# Patient Record
Sex: MALE | Employment: UNEMPLOYED | URBAN - METROPOLITAN AREA
[De-identification: names, ages, dates, MRNs, and addresses within clinical notes are randomized per-mention and may not be internally consistent; named-entity substitution may affect disease eponyms.]

---

## 2017-03-06 DIAGNOSIS — I49.9 IRREGULAR HEART BEAT: Primary | ICD-10-CM

## 2017-03-07 ENCOUNTER — HOSPITAL ENCOUNTER (OUTPATIENT)
Dept: CARDIOLOGY | Facility: CLINIC | Age: 62
Discharge: HOME | End: 2017-03-07
Payer: COMMERCIAL

## 2017-03-07 ENCOUNTER — OFFICE VISIT (OUTPATIENT)
Dept: CARDIOLOGY | Facility: CLINIC | Age: 62
End: 2017-03-07
Payer: COMMERCIAL

## 2017-03-07 VITALS
SYSTOLIC BLOOD PRESSURE: 192 MMHG | WEIGHT: 165.13 LBS | HEIGHT: 64 IN | HEART RATE: 50 BPM | BODY MASS INDEX: 28.19 KG/M2 | DIASTOLIC BLOOD PRESSURE: 110 MMHG

## 2017-03-07 DIAGNOSIS — Z95.1 S/P CABG (CORONARY ARTERY BYPASS GRAFT): ICD-10-CM

## 2017-03-07 DIAGNOSIS — I49.3 PVC'S (PREMATURE VENTRICULAR CONTRACTIONS): ICD-10-CM

## 2017-03-07 DIAGNOSIS — I10 SEVERE HYPERTENSION: ICD-10-CM

## 2017-03-07 DIAGNOSIS — I49.9 IRREGULAR HEART BEAT: ICD-10-CM

## 2017-03-07 LAB
AORTIC VALVE REGURGITATION: ABNORMAL
DIASTOLIC DYSFUNCTION: YES
ESTIMATED PA SYSTOLIC PRESSURE: 33.03
MITRAL VALVE MOBILITY: NORMAL
MITRAL VALVE REGURGITATION: ABNORMAL
RETIRED EF AND QEF - SEE NOTES: 40 (ref 55–65)
TRICUSPID VALVE REGURGITATION: ABNORMAL

## 2017-03-07 PROCEDURE — 93306 TTE W/DOPPLER COMPLETE: CPT | Mod: S$GLB,,, | Performed by: INTERNAL MEDICINE

## 2017-03-07 PROCEDURE — 99203 OFFICE O/P NEW LOW 30 MIN: CPT | Mod: S$GLB,,, | Performed by: INTERNAL MEDICINE

## 2017-03-07 PROCEDURE — 99999 PR PBB SHADOW E&M-EST. PATIENT-LVL III: CPT | Mod: PBBFAC,,, | Performed by: INTERNAL MEDICINE

## 2017-03-07 PROCEDURE — 93000 ELECTROCARDIOGRAM COMPLETE: CPT | Mod: S$GLB,,, | Performed by: INTERNAL MEDICINE

## 2017-03-07 PROCEDURE — 1160F RVW MEDS BY RX/DR IN RCRD: CPT | Mod: S$GLB,,, | Performed by: INTERNAL MEDICINE

## 2017-03-07 PROCEDURE — 3080F DIAST BP >= 90 MM HG: CPT | Mod: S$GLB,,, | Performed by: INTERNAL MEDICINE

## 2017-03-07 PROCEDURE — 3077F SYST BP >= 140 MM HG: CPT | Mod: S$GLB,,, | Performed by: INTERNAL MEDICINE

## 2017-03-07 RX ORDER — CARVEDILOL 25 MG/1
12.5 TABLET ORAL 2 TIMES DAILY WITH MEALS
Qty: 60 TABLET | Refills: 11 | Status: SHIPPED | OUTPATIENT
Start: 2017-03-07 | End: 2017-03-07 | Stop reason: SDUPTHER

## 2017-03-07 RX ORDER — ASPIRIN 81 MG/1
81 TABLET ORAL DAILY
COMMUNITY
End: 2017-03-09

## 2017-03-07 RX ORDER — CHLORTHALIDONE 25 MG/1
25 TABLET ORAL DAILY
Qty: 30 TABLET | Refills: 11 | Status: SHIPPED | OUTPATIENT
Start: 2017-03-07 | End: 2017-04-29 | Stop reason: SDUPTHER

## 2017-03-07 RX ORDER — CARVEDILOL 25 MG/1
12.5 TABLET ORAL 2 TIMES DAILY WITH MEALS
Qty: 60 TABLET | Refills: 11 | Status: SHIPPED | OUTPATIENT
Start: 2017-03-07 | End: 2017-03-09 | Stop reason: SDUPTHER

## 2017-03-07 RX ORDER — CHLORTHALIDONE 25 MG/1
25 TABLET ORAL DAILY
Qty: 30 TABLET | Refills: 11 | Status: SHIPPED | OUTPATIENT
Start: 2017-03-07 | End: 2017-03-07 | Stop reason: SDUPTHER

## 2017-03-07 NOTE — MR AVS SNAPSHOT
[facility]  [address]  [facility] [address]  Phone: [phone]                  Poppy Jeffries   3/7/2017 2:00 PM   Office Visit    Description:  Male : 1955   Provider:  Jose Rafael Olivas MD   Department:  [facility]           Reason for Visit     Irregular Heart Beat     Hypertension           Diagnoses this Visit        Comments    S/P CABG (coronary artery bypass graft)         Severe hypertension         PVC's (premature ventricular contractions)                To Do List           Future Appointments        Provider Department Dept Phone    3/7/2017 3:15 PM [facility] [phone]    3/9/2017 8:00 AM Jose Rafael Olivas MD [facility] Card[facility] [phone]      Goals (5 Years of Data)     None      Follow-Up and Disposition     Return in about 2 days (around 3/9/2017).       These Medications        Disp Refills Start End    chlorthalidone (HYGROTEN) 25 MG Tab 30 tablet 11 3/7/2017     Take 1 tablet (25 mg total) by mouth once daily. - Oral    carvedilol (COREG) 25 MG tablet 60 tablet 11 3/7/2017     Take 0.5 tablets (12.5 mg total) by mouth 2 (two) times daily with meals. - Oral      [facility]     [facility] -  Assistance  Registered nurses in the [facility] provide clinical advisement, health education, appointment booking, and other advisory services.  Call for this free service at [phone].             Medications           Message regarding Medications     Verify the changes and/or additions to your medication regime listed below are the same as discussed with your clinician today.  If any of these changes or additions are incorrect, please notify your healthcare provider.        START taking these NEW medications        Refills    chlorthalidone (HYGROTEN) 25 MG Tab 11    Sig: Take 1 tablet (25 mg total) by mouth once daily.    Class: Print    Route: Oral    carvedilol  "(COREG) 25 MG tablet 11    Sig: Take 0.5 tablets (12.5 mg total) by mouth 2 (two) times daily with meals.    Class: Print    Route: Oral           Verify that the below list of medications is an accurate representation of the medications you are currently taking.  If none reported, the list may be blank. If incorrect, please contact your healthcare provider. Carry this list with you in case of emergency.           Current Medications     aspirin (ECOTRIN) 81 MG EC tablet Take 81 mg by mouth once daily.    carvedilol (COREG) 25 MG tablet Take 0.5 tablets (12.5 mg total) by mouth 2 (two) times daily with meals.    chlorthalidone (HYGROTEN) 25 MG Tab Take 1 tablet (25 mg total) by mouth once daily.           Clinical Reference Information           Your Vitals Were     BP Pulse Height Weight BMI    192/110 (BP Location: Left arm, Patient Position: Sitting, BP Method: Manual) 50 5' 4" (1.626 m) 74.9 kg (165 lb 2 oz) 28.34 kg/m2      Blood Pressure          Most Recent Value    Right Arm BP - Sitting  208/112    Left Arm BP - Sitting  192/110    BP  (!)  192/110      Allergies as of 3/7/2017     No Known Allergies      Immunizations Administered on Date of Encounter - 3/7/2017     None      Orders Placed During Today's Visit     Future Labs/Procedures Expected by Expires    Comprehensive metabolic panel  3/7/2017 5/6/2018    Lipid panel  3/7/2017 (Approximate) 3/7/2018    2D echo with color flow doppler and bubble contrast  As directed 3/7/2018      [facility] Sign-Up     Activating your [facility] account is as easy as 1-2-3!     1) Visit [email or web address], select Sign Up Now, enter this activation code and your date of birth, then select Next.  [email or web address]  Expires: 4/21/2017  1:04 PM      2) Create a username and password to use when you visit [facility] in the future and select a security question in case you lose your password and select Next.    3) Enter your e-mail address and click Sign Up!    Additional " Information  If you have questions, please e-mail [email or web address] or call [phone] to talk to our [facility] staff. Remember, [facility] is NOT to be used for urgent needs. For medical emergencies, dial 911.         Language Assistance Services     ATTENTION: Language assistance services are available, free of charge. Please call [phone].      ATENCIÓN: Si habla español, tiene a sanchez disposición servicios gratuitos de asistencia lingüística. Llame al [phone].     [facility] Ý: N?u b?n nói Ti?ng Vi?t, có các d?ch v? h? tr? ngôn ng? mi?n phí dành cho b?n. G?i s? [phone].         [facility] complies with applicable Federal civil rights laws and does not discriminate on the basis of race, color, national origin, age, disability, or sex.

## 2017-03-07 NOTE — PROGRESS NOTES
Chart has been dictated using voice recognition software.  It is not been reviewed carefully for any transcriptional errors due to this technology.   Subjective:   Patient ID:  Poppy Jeffries is a 61 y.o. male who presents for evaluation of Irregular Heart Beat and Hypertension      HPI: Patient applied for job at [employer] and was found to have hypertension and arrhythmia. Patient denies any chest discomfort on exertion or at rest.   Patient denies any dyspnea at rest or on exertion, orthopnea, PND, or edema.  Patient denies any palpitations, lightheadedness, or syncope. Patient denies lower extremity claudication.  Has had hypertension since 2000 on lisinopril until 2010 when he lost his job.  Has not had medication for 7 years.    Patient s/p CABG in 2004 in [place]    Cardiac risk factors: hyperlipidemia, hypertension    History reviewed. No pertinent past medical history.    Past Surgical History:   Procedure Laterality Date    BYPASS GRAFT  2004    CORONARY ARTERY BYPASS GRAFT  2004       Social History   Substance Use Topics    Smoking status: Never Smoker    Smokeless tobacco: None    Alcohol use No         Current Outpatient Prescriptions:     aspirin (ECOTRIN) 81 MG EC tablet, Take 81 mg by mouth once daily., Disp: , Rfl:     carvedilol (COREG) 25 MG tablet, Take 0.5 tablets (12.5 mg total) by mouth 2 (two) times daily with meals., Disp: 60 tablet, Rfl: 11    chlorthalidone (HYGROTEN) 25 MG Tab, Take 1 tablet (25 mg total) by mouth once daily., Disp: 30 tablet, Rfl: 11    Review of patient's allergies indicates:  No Known Allergies    ROS - - The patient's review of systems is negative for any significant constitutional, respiratory, endocrine, hematologic, musculoskeletal or neurologic symptoms.   Objective:   Physical Exam   Constitutional: He is oriented to person, place, and time. He appears well-developed and well-nourished.   BP (!) 192/110 (BP Location: Left arm, Patient Position:  "Sitting, BP Method: Manual)  Pulse (!) 50  Ht 5' 4" (1.626 m)  Wt 74.9 kg (165 lb 2 oz)  BMI 28.34 kg/m2   Eyes: Pupils are equal, round, and reactive to light.   Neck: Neck supple. No JVD present. Carotid bruit is not present. No thyromegaly present.   Cardiovascular: Normal rate, regular rhythm and intact distal pulses.  Frequent extrasystoles are present. PMI is not displaced.  Exam reveals gallop and S4. Exam reveals no friction rub.    No murmur heard.  Pulmonary/Chest: Effort normal and breath sounds normal. He has no wheezes. He has no rales.       Abdominal: Soft. Bowel sounds are normal. There is no hepatosplenomegaly. There is no tenderness.   Musculoskeletal: He exhibits no edema.   Neurological: He is alert and oriented to person, place, and time. He has normal strength.   Skin: No cyanosis. Nails show no clubbing.       No results found for: WBC, HGB, HCT, MCV, PLT    No results found for: NA, K, BUN, CREATININE, GLU, HGBA1C, CHOL, HDL, LDLCALC, TRIG, CHOLHDL, HGB, HCT, PLT, INR     ECG shows sinus tachycardia with significant voltage increased compatible with LVH and frequent PVCs occasionally in couplets.  There are also Q waves in the inferior leads which could be compatible with an inferior infarct of undetermined age.    Pulmonary results of his echocardiogram obtained today shows reduced left ventricular function about 35-40% with some inferolateral hypokinesis over and above the global hypokinesis.  There is mild LV enlargement, mild to moderate aortic insufficiency, mild mitral regurgitation.  Official report is pending    Assessment:     1. S/P CABG (coronary artery bypass graft)    2. Severe hypertension    3. PVC's (premature ventricular contractions)      The patient has a significant cardiac problems due to not taking antihypertensive medication for at least 7 years.  He is totally asymptomatic at this time despite showing a significant cardiomyopathy with mild aortic insufficiency, " severe hypertension, and significant ventricular ectopy on his ECG.  As the patient is totally asymptomatic, and attempt will be made to treat him initially as an outpatient.  The patient will be put on chlorthalidone and a moderate dose of carvedilol starting today and will be seen back on Thursday morning.  Ultimately, the patient will need to be on goal-directed therapy for his cardiomyopathy.  The patient will also need to undergo stress testing to determine if there is an active ischemic complement to his cardiac disease.  Further decisions in the patient's care will be determined by his blood pressure when he returns in 2 days.  Plan:     Poppy was seen today for irregular heart beat and hypertension.    Diagnoses and all orders for this visit:    S/P CABG (coronary artery bypass graft)  -     2D echo with color flow doppler and bubble contrast; Future  -     Discontinue: chlorthalidone (HYGROTEN) 25 MG Tab; Take 1 tablet (25 mg total) by mouth once daily.  -     Discontinue: carvedilol (COREG) 25 MG tablet; Take 0.5 tablets (12.5 mg total) by mouth 2 (two) times daily with meals.  -     Comprehensive metabolic panel; Future; Expected date: 3/7/17  -     Lipid panel; Future; Expected date: 3/7/17  -     carvedilol (COREG) 25 MG tablet; Take 0.5 tablets (12.5 mg total) by mouth 2 (two) times daily with meals.  -     chlorthalidone (HYGROTEN) 25 MG Tab; Take 1 tablet (25 mg total) by mouth once daily.    Severe hypertension  -     2D echo with color flow doppler and bubble contrast; Future  -     Discontinue: chlorthalidone (HYGROTEN) 25 MG Tab; Take 1 tablet (25 mg total) by mouth once daily.  -     Discontinue: carvedilol (COREG) 25 MG tablet; Take 0.5 tablets (12.5 mg total) by mouth 2 (two) times daily with meals.  -     Comprehensive metabolic panel; Future; Expected date: 3/7/17  -     Lipid panel; Future; Expected date: 3/7/17  -     carvedilol (COREG) 25 MG tablet; Take 0.5 tablets (12.5 mg total) by  mouth 2 (two) times daily with meals.  -     chlorthalidone (HYGROTEN) 25 MG Tab; Take 1 tablet (25 mg total) by mouth once daily.    PVC's (premature ventricular contractions)    Other orders  -     aspirin (ECOTRIN) 81 MG EC tablet; Take 81 mg by mouth once daily.

## 2017-03-09 ENCOUNTER — OFFICE VISIT (OUTPATIENT)
Dept: CARDIOLOGY | Facility: CLINIC | Age: 62
End: 2017-03-09
Payer: COMMERCIAL

## 2017-03-09 VITALS
DIASTOLIC BLOOD PRESSURE: 92 MMHG | SYSTOLIC BLOOD PRESSURE: 139 MMHG | HEIGHT: 69 IN | HEART RATE: 79 BPM | WEIGHT: 164.25 LBS | BODY MASS INDEX: 24.33 KG/M2

## 2017-03-09 DIAGNOSIS — I10 SEVERE HYPERTENSION: ICD-10-CM

## 2017-03-09 DIAGNOSIS — E78.5 HYPERLIPIDEMIA, UNSPECIFIED: ICD-10-CM

## 2017-03-09 DIAGNOSIS — I10 ESSENTIAL HYPERTENSION: ICD-10-CM

## 2017-03-09 DIAGNOSIS — I51.89 COMBINED SYSTOLIC AND DIASTOLIC CARDIAC DYSFUNCTION: ICD-10-CM

## 2017-03-09 DIAGNOSIS — I49.3 PVC'S (PREMATURE VENTRICULAR CONTRACTIONS): ICD-10-CM

## 2017-03-09 DIAGNOSIS — Z95.1 S/P CABG (CORONARY ARTERY BYPASS GRAFT): Primary | ICD-10-CM

## 2017-03-09 PROCEDURE — 3080F DIAST BP >= 90 MM HG: CPT | Mod: S$GLB,,, | Performed by: INTERNAL MEDICINE

## 2017-03-09 PROCEDURE — 99213 OFFICE O/P EST LOW 20 MIN: CPT | Mod: S$GLB,,, | Performed by: INTERNAL MEDICINE

## 2017-03-09 PROCEDURE — 99999 PR PBB SHADOW E&M-EST. PATIENT-LVL III: CPT | Mod: PBBFAC,,, | Performed by: INTERNAL MEDICINE

## 2017-03-09 PROCEDURE — 1160F RVW MEDS BY RX/DR IN RCRD: CPT | Mod: S$GLB,,, | Performed by: INTERNAL MEDICINE

## 2017-03-09 PROCEDURE — 3075F SYST BP GE 130 - 139MM HG: CPT | Mod: S$GLB,,, | Performed by: INTERNAL MEDICINE

## 2017-03-09 RX ORDER — CARVEDILOL 25 MG/1
25 TABLET ORAL 2 TIMES DAILY WITH MEALS
Qty: 60 TABLET | Refills: 11 | Status: SHIPPED | OUTPATIENT
Start: 2017-03-09 | End: 2017-04-04 | Stop reason: SDUPTHER

## 2017-03-09 RX ORDER — ATORVASTATIN CALCIUM 40 MG/1
40 TABLET, FILM COATED ORAL DAILY
Qty: 30 TABLET | Refills: 11 | Status: SHIPPED | OUTPATIENT
Start: 2017-03-09 | End: 2017-03-16 | Stop reason: SDUPTHER

## 2017-03-09 RX ORDER — VALSARTAN 160 MG/1
160 TABLET ORAL DAILY
Qty: 30 TABLET | Refills: 11 | Status: SHIPPED | OUTPATIENT
Start: 2017-03-09 | End: 2017-04-27 | Stop reason: SDUPTHER

## 2017-03-09 NOTE — PROGRESS NOTES
"Chart has been dictated using voice recognition software.  It is not been reviewed carefully for any transcriptional errors due to this technology.   Subjective:   Patient ID:  Poppy Jeffries is a 61 y.o. male who presents for follow-up of S/P CABG (coronary artery bypass graft)      HPI: Patient with h/o uncontrolled hypertension now with systolic dysfunction on echocardiogram below.  Patient started on carveidilol and chlorthalidone 2 days ago.  Pateitn feeling better with "eyes not hot" and "muscles more relaxed. Remains asymptomatic for ischemia, arrhythmia, and heart failure.    Echo 07-Mar-2017    1 - Mild aortic regurgitation.     2 - Upper limit of normal left ventricular enlargement.     3 - Eccentric hypertrophy.     4 - Mildly to moderately depressed left ventricular systolic function (EF 40-45%).     5 - Quantitatively measured LV function is 43%.     6 - Left ventricular diastolic dysfunction.     7 - Mild mitral regurgitation.     8 - Moderate left atrial enlargement.     9 - The estimated PA systolic pressure is 33 mmHg.     10 - Right ventricular enlargement with low normal to mildly depressed systolic function.     11 - Trivial tricuspid regurgitation.       Current Outpatient Prescriptions:     carvedilol (COREG) 25 MG tablet, Take 1 tablet (25 mg total) by mouth 2 (two) times daily with meals., Disp: 60 tablet, Rfl: 11    chlorthalidone (HYGROTEN) 25 MG Tab, Take 1 tablet (25 mg total) by mouth once daily., Disp: 30 tablet, Rfl: 11    atorvastatin (LIPITOR) 40 MG tablet, Take 1 tablet (40 mg total) by mouth once daily., Disp: 30 tablet, Rfl: 11    valsartan (DIOVAN) 160 MG tablet, Take 1 tablet (160 mg total) by mouth once daily., Disp: 30 tablet, Rfl: 11    ROS - No change from prior visit in neurologic, respiratory, endocrine, GI, hematologic, or constitutional complaints   Objective:   Physical Exam   Constitutional: He is oriented to person, place, and time. He appears well-developed and " "well-nourished.   BP (!) 139/92 (BP Location: Left arm, Patient Position: Sitting, BP Method: Automatic)  Pulse 79  Ht 5' 9" (1.753 m)  Wt 74.5 kg (164 lb 3.9 oz)  BMI 24.25 kg/m2   Eyes: Pupils are equal, round, and reactive to light.   Neck: Neck supple. No JVD present. Carotid bruit is not present. No thyromegaly present.   Cardiovascular: Normal rate, regular rhythm and intact distal pulses.  Frequent extrasystoles are present. PMI is not displaced.  Exam reveals gallop and S4. Exam reveals no friction rub.    No murmur heard.  Pulmonary/Chest: Effort normal and breath sounds normal. He has no wheezes. He has no rales.   Abdominal: Soft. Bowel sounds are normal. There is no hepatosplenomegaly. There is no tenderness.   Musculoskeletal: He exhibits no edema.   Neurological: He is alert and oriented to person, place, and time. He has normal strength.   Skin: No cyanosis. Nails show no clubbing.         Lab Results   Component Value Date     03/07/2017    K 4.7 03/07/2017    BUN 15 03/07/2017    CREATININE 1.3 03/07/2017    GLU 94 03/07/2017    CHOL 290 (H) 03/07/2017    HDL 45 03/07/2017    LDLCALC 204.0 (H) 03/07/2017    TRIG 205 (H) 03/07/2017    CHOLHDL 15.5 (L) 03/07/2017       Assessment:     1. S/P CABG (coronary artery bypass graft)    2. Essential hypertension    3. PVC's (premature ventricular contractions)    4. Combined systolic and diastolic cardiac dysfunction    5. Hyperlipidemia, unspecified    6. Severe hypertension      The patient's blood pressure is much better controlled at this point the patient actually seems to be feeling better with controlled blood pressure.  His echocardiogram shows reduced ejection fraction and his ectopy is still present.  The patient's cholesterol was markedly elevated although the triglycerides were within normal limits even though the patient was nonfasting.  The patient also has been taking carvedilol 25 mg twice a day along with his chlorthalidone.  His " cardiomyopathy may be as result of uncontrolled blood pressure.  The wall motion abnormality noted may have been due to his prior coronary disease.  As patient is asymptomatic at this time, stress testing will not be performed.  However, the patient's blood pressure needs further control, and with the reduction in ejection fraction, he will be started on angiotensin receptor blocker.  Patient will also be started on a statin for his cholesterol.  Patient will return in one week for reevaluation.  If his blood pressure is well-controlled at that time, the patient will be permitted to start working if he remains asymptomatic.  Additionally, the patient will be referred to ambulatory medicine so that he may have primary care provider.  Plan:     Poppy was seen today for s/p cabg (coronary artery bypass graft).    Diagnoses and all orders for this visit:    S/P CABG (coronary artery bypass graft)  -     valsartan (DIOVAN) 160 MG tablet; Take 1 tablet (160 mg total) by mouth once daily.  -     carvedilol (COREG) 25 MG tablet; Take 1 tablet (25 mg total) by mouth 2 (two) times daily with meals.  -     Ambulatory Referral to Internal Medicine    Essential hypertension  -     valsartan (DIOVAN) 160 MG tablet; Take 1 tablet (160 mg total) by mouth once daily.  -     carvedilol (COREG) 25 MG tablet; Take 1 tablet (25 mg total) by mouth 2 (two) times daily with meals.  -     Ambulatory Referral to Internal Medicine    PVC's (premature ventricular contractions)  -     carvedilol (COREG) 25 MG tablet; Take 1 tablet (25 mg total) by mouth 2 (two) times daily with meals.  -     Ambulatory Referral to Internal Medicine    Combined systolic and diastolic cardiac dysfunction  -     valsartan (DIOVAN) 160 MG tablet; Take 1 tablet (160 mg total) by mouth once daily.  -     carvedilol (COREG) 25 MG tablet; Take 1 tablet (25 mg total) by mouth 2 (two) times daily with meals.  -     Ambulatory Referral to Internal  Medicine    Hyperlipidemia, unspecified  -     Ambulatory Referral to Internal Medicine    Severe hypertension  -     valsartan (DIOVAN) 160 MG tablet; Take 1 tablet (160 mg total) by mouth once daily.  -     carvedilol (COREG) 25 MG tablet; Take 1 tablet (25 mg total) by mouth 2 (two) times daily with meals.    Other orders  -     atorvastatin (LIPITOR) 40 MG tablet; Take 1 tablet (40 mg total) by mouth once daily.

## 2017-03-09 NOTE — MR AVS SNAPSHOT
[facility]  [address]  [facility] [address]  Phone: [phone]                  Poppy Jeffries   3/9/2017 1:30 PM   Office Visit    Description:  Male : 1955   Provider:  Jose Rafael Olivas MD   Department:  [facility]           Reason for Visit     S/P CABG (coronary artery bypass graft)           Diagnoses this Visit        Comments    S/P CABG (coronary artery bypass graft)    -  Primary     Essential hypertension         PVC's (premature ventricular contractions)         Combined systolic and diastolic cardiac dysfunction         Hyperlipidemia, unspecified         Severe hypertension                To Do List           Goals (5 Years of Data)     None      Follow-Up and Disposition     Return in about 7 days (around 3/16/2017).       These Medications        Disp Refills Start End    valsartan (DIOVAN) 160 MG tablet 30 tablet 11 3/9/2017     Take 1 tablet (160 mg total) by mouth once daily. - Oral    Pharmacy: [facility]/pharmacy #[id number] - [place] - [address] [facility] Ph #: [phone]       atorvastatin (LIPITOR) 40 MG tablet 30 tablet 11 3/9/2017     Take 1 tablet (40 mg total) by mouth once daily. - Oral    Pharmacy: [facility]/pharm[facility] #[id number] [place] [facility] [address] [facility] Ph #: [phone]       carvedilol (COREG) 25 MG tablet 60 tablet 11 3/9/2017     Take 1 tablet (25 mg total) by mouth 2 (two) times daily with meals. - Oral    Pharmacy: [facility]/pharmacy #[id number] - [place] - [address] [facility] Ph #: [phone]         [facility]     [facility] -  Assistance  Registered nurses in the [facility] provide clinical advisement, health education, appointment booking, and other advisory services.  Call for this free service at [phone].             Medications           Message regarding Medications     Verify the changes and/or additions to your medication regime listed below are the same as discussed with your  "clinician today.  If any of these changes or additions are incorrect, please notify your healthcare provider.        START taking these NEW medications        Refills    valsartan (DIOVAN) 160 MG tablet 11    Sig: Take 1 tablet (160 mg total) by mouth once daily.    Class: Normal    Route: Oral    atorvastatin (LIPITOR) 40 MG tablet 11    Sig: Take 1 tablet (40 mg total) by mouth once daily.    Class: Normal    Route: Oral      CHANGE how you are taking these medications     Start Taking Instead of    carvedilol (COREG) 25 MG tablet carvedilol (COREG) 25 MG tablet    Dosage:  Take 1 tablet (25 mg total) by mouth 2 (two) times daily with meals. Dosage:  Take 0.5 tablets (12.5 mg total) by mouth 2 (two) times daily with meals.    Reason for Change:  Reorder       STOP taking these medications     aspirin (ECOTRIN) 81 MG EC tablet Take 81 mg by mouth once daily.           Verify that the below list of medications is an accurate representation of the medications you are currently taking.  If none reported, the list may be blank. If incorrect, please contact your healthcare provider. Carry this list with you in case of emergency.           Current Medications     atorvastatin (LIPITOR) 40 MG tablet Take 1 tablet (40 mg total) by mouth once daily.    carvedilol (COREG) 25 MG tablet Take 1 tablet (25 mg total) by mouth 2 (two) times daily with meals.    chlorthalidone (HYGROTEN) 25 MG Tab Take 1 tablet (25 mg total) by mouth once daily.    valsartan (DIOVAN) 160 MG tablet Take 1 tablet (160 mg total) by mouth once daily.           Clinical Reference Information           Your Vitals Were     BP Pulse Height Weight BMI    139/92 (BP Location: Left arm, Patient Position: Sitting, BP Method: Automatic) 79 5' 9" (1.753 m) 74.5 kg (164 lb 3.9 oz) 24.25 kg/m2      Blood Pressure          Most Recent Value    Right Arm BP - Sitting  141/106    Left Arm BP - Sitting  139/92    BP  (!)  139/92      Allergies as of 3/9/2017     No " Known Allergies      Immunizations Administered on Date of Encounter - 3/9/2017     None      Orders Placed During Today's Visit      Normal Orders This Visit    Ambulatory Referral to Internal Medicine       [facility] Sign-Up     Activating your [facility] account is as easy as 1-2-3!     1) Visit [email or web address], select Sign Up Now, enter this activation code and your date of birth, then select Next.  [email or web address]  Expires: 4/21/2017  1:04 PM      2) Create a username and password to use when you visit [facility] in the future and select a security question in case you lose your password and select Next.    3) Enter your e-mail address and click Sign Up!    Additional Information  If you have questions, please e-mail [email or web address] or call [phone] to talk to our [facility] staff. Remember, [facility] is NOT to be used for urgent needs. For medical emergencies, dial 911.         Language Assistance Services     ATTENTION: Language assistance services are available, free of charge. Please call [phone].      ATENCIÓN: Si habla español, tiene a sanchez disposición servicios gratuitos de asistencia lingüística. Llame al [phone].     [facility] Ý: N?u b?n nói Ti?ng Vi?t, có các d?ch v? h? tr? ngôn ng? mi?n phí dành cho b?n. G?i s? [phone].         [facility] complies with applicable Federal civil rights laws and does not discriminate on the basis of race, color, national origin, age, disability, or sex.

## 2017-03-16 ENCOUNTER — OFFICE VISIT (OUTPATIENT)
Dept: CARDIOLOGY | Facility: CLINIC | Age: 62
End: 2017-03-16
Payer: COMMERCIAL

## 2017-03-16 VITALS
BODY MASS INDEX: 27.44 KG/M2 | HEIGHT: 65 IN | HEART RATE: 70 BPM | WEIGHT: 164.69 LBS | DIASTOLIC BLOOD PRESSURE: 87 MMHG | SYSTOLIC BLOOD PRESSURE: 147 MMHG

## 2017-03-16 DIAGNOSIS — Z95.1 S/P CABG (CORONARY ARTERY BYPASS GRAFT): ICD-10-CM

## 2017-03-16 DIAGNOSIS — I10 ESSENTIAL HYPERTENSION: Primary | ICD-10-CM

## 2017-03-16 DIAGNOSIS — I49.3 PVC'S (PREMATURE VENTRICULAR CONTRACTIONS): ICD-10-CM

## 2017-03-16 DIAGNOSIS — I10 SEVERE HYPERTENSION: ICD-10-CM

## 2017-03-16 DIAGNOSIS — I51.89 COMBINED SYSTOLIC AND DIASTOLIC CARDIAC DYSFUNCTION: ICD-10-CM

## 2017-03-16 DIAGNOSIS — E78.5 HYPERLIPIDEMIA, UNSPECIFIED: ICD-10-CM

## 2017-03-16 PROCEDURE — 3077F SYST BP >= 140 MM HG: CPT | Mod: S$GLB,,, | Performed by: INTERNAL MEDICINE

## 2017-03-16 PROCEDURE — 99999 PR PBB SHADOW E&M-EST. PATIENT-LVL III: CPT | Mod: PBBFAC,,, | Performed by: INTERNAL MEDICINE

## 2017-03-16 PROCEDURE — 1160F RVW MEDS BY RX/DR IN RCRD: CPT | Mod: S$GLB,,, | Performed by: INTERNAL MEDICINE

## 2017-03-16 PROCEDURE — 99213 OFFICE O/P EST LOW 20 MIN: CPT | Mod: S$GLB,,, | Performed by: INTERNAL MEDICINE

## 2017-03-16 PROCEDURE — 3079F DIAST BP 80-89 MM HG: CPT | Mod: S$GLB,,, | Performed by: INTERNAL MEDICINE

## 2017-03-16 RX ORDER — ATORVASTATIN CALCIUM 40 MG/1
80 TABLET, FILM COATED ORAL DAILY
Qty: 30 TABLET | Refills: 11 | Status: SHIPPED | OUTPATIENT
Start: 2017-03-16 | End: 2018-03-12 | Stop reason: SDUPTHER

## 2017-03-16 RX ORDER — ASPIRIN 81 MG/1
81 TABLET ORAL DAILY
Qty: 90 TABLET | Refills: 3
Start: 2017-03-16

## 2017-03-16 NOTE — PROGRESS NOTES
"Chart has been dictated using voice recognition software.  It is not been reviewed carefully for any transcriptional errors due to this technology.   Subjective:   Patient ID:  Poppy Jeffries is a 61 y.o. male who presents for follow-up of S/P CABG (coronary artery bypass graft) (2 week f/u )      HPI: Patient with essential hypertension , asymptomatic PVCs, Combined systolic and diastolic cardiac dysfunction, hyperlipidemia, severe hypertension, and S/P CABG. patient returns after having change in his medications.  Has tolerated his medications without any side effects. Patient denies any chest discomfort on exertion or at rest.  Patient denies any dyspnea at rest or on exertion, orthopnea, PND, or edema.  Patient denies any palpitations, lightheadedness, or syncope.             Current Outpatient Prescriptions:     atorvastatin (LIPITOR) 40 MG tablet, Take 1 tablet (40 mg total) by mouth once daily., Disp: 30 tablet, Rfl: 11    carvedilol (COREG) 25 MG tablet, Take 1 tablet (25 mg total) by mouth 2 (two) times daily with meals., Disp: 60 tablet, Rfl: 11    chlorthalidone (HYGROTEN) 25 MG Tab, Take 1 tablet (25 mg total) by mouth once daily., Disp: 30 tablet, Rfl: 11    valsartan (DIOVAN) 160 MG tablet, Take 1 tablet (160 mg total) by mouth once daily., Disp: 30 tablet, Rfl: 11    ROS No change from prior visit in neurologic, respiratory, endocrine, GI, hematologic, or constitutional complaints   Objective:   Physical Exam   Constitutional: He is oriented to person, place, and time. He appears well-developed and well-nourished.   BP (!) 147/87 (BP Location: Left arm, Patient Position: Sitting, BP Method: Automatic)  Pulse 70  Ht 5' 5" (1.651 m)  Wt 74.7 kg (164 lb 10.9 oz)  BMI 27.4 kg/m2  Repeat blood pressure manually was 120/82 on the right arm sitting.   Eyes: Pupils are equal, round, and reactive to light.   Neck: Neck supple. No JVD present. Carotid bruit is not present. No thyromegaly present. "   Cardiovascular: Normal rate, regular rhythm and intact distal pulses.   Occasional extrasystoles are present. PMI is not displaced.  Exam reveals gallop and S4 (soft). Exam reveals no friction rub.    No murmur heard.  Pulmonary/Chest: Effort normal and breath sounds normal. He has no wheezes. He has no rales.   Abdominal: Soft. Bowel sounds are normal. There is no hepatosplenomegaly. There is no tenderness.   Musculoskeletal: He exhibits no edema.   Neurological: He is alert and oriented to person, place, and time. He has normal strength.   Skin: No cyanosis. Nails show no clubbing.         Lab Results   Component Value Date     03/07/2017    K 4.7 03/07/2017    BUN 15 03/07/2017    CREATININE 1.3 03/07/2017    GLU 94 03/07/2017    CHOL 290 (H) 03/07/2017    HDL 45 03/07/2017    LDLCALC 204.0 (H) 03/07/2017    TRIG 205 (H) 03/07/2017    CHOLHDL 15.5 (L) 03/07/2017       Assessment:     1. Essential hypertension    2. PVC's (premature ventricular contractions)    3. Hyperlipidemia, unspecified    4. S/P CABG (coronary artery bypass graft)    5. Combined systolic and diastolic cardiac dysfunction      Patient is doing very well tolerating his medications without problems.  The atorvastatin will be increased so the patient is on 80 mg daily as he is tolerating his initial dose.  Patient will continue on his current dose of valsartan.  The patient had stopped taking his aspirin when he started these medications.  The patient was instructed to restart aspirin 81 mg daily.  Patient will return in 6 weeks.  At that time, lipid profile will be repeated.  If the patient's blood pressure is still good, then the valsartan will be increased to achieve goal directed therapy.  Patient will be released to work without restrictions.  The patient has been cautioned that if he develops chest discomfort, shortness of breath, or dizziness at work he is to notify us and come back to be reevaluated prior to continuing  work.  Plan:     Poppy was seen today for s/p cabg (coronary artery bypass graft).    Diagnoses and all orders for this visit:    Essential hypertension    PVC's (premature ventricular contractions)    Hyperlipidemia, unspecified  -     Lipid panel; Future; Expected date: 4/27/17    S/P CABG (coronary artery bypass graft)    Combined systolic and diastolic cardiac dysfunction

## 2017-03-16 NOTE — MR AVS SNAPSHOT
[facility]  [address]  [facility] [address]  Phone: [phone]                  Poppy Jeffries   3/16/2017 8:00 AM   Office Visit    Description:  Male : 1955   Provider:  Jose Rafael Olivas MD   Department:  [facility]           Reason for Visit     S/P CABG (coronary artery bypass graft)           Diagnoses this Visit        Comments    Essential hypertension    -  Primary     PVC's (premature ventricular contractions)         Hyperlipidemia, unspecified         S/P CABG (coronary artery bypass graft)         Combined systolic and diastolic cardiac dysfunction         Severe hypertension                To Do List           Goals (5 Years of Data)     None      Follow-Up and Disposition     Return in about 6 weeks (around 2017).    Follow-up and Disposition History       These Medications        Disp Refills Start End    aspirin (ECOTRIN) 81 MG EC tablet 90 tablet 3 3/16/2017     Take 1 tablet (81 mg total) by mouth once daily. - Oral    Pharmacy: [facility]/pharmacy #[id number] - [place] - [address] Ph #: [phone]       atorvastatin (LIPITOR) 40 MG tablet 30 tablet 11 3/16/2017     Take 2 tablets (80 mg total) by mouth once daily. - Oral    Pharmacy: [facility]/pharmacy #[id number] - [place] - [address] Ph #: [phone]         [facility]     [facility] On Call Nurse Care Line - 24/ Assistance  Registered nurses in the [facility] On Call Center provide clinical advisement, health education, appointment booking, and other advisory services.  Call for this free service at [phone].             Medications           Message regarding Medications     Verify the changes and/or additions to your medication regime listed below are the same as discussed with your clinician today.  If any of these changes or additions are incorrect, please notify your healthcare provider.        START taking these NEW medications        Refills    aspirin (ECOTRIN) 81  "MG EC tablet 3    Sig: Take 1 tablet (81 mg total) by mouth once daily.    Class: No Print    Route: Oral      CHANGE how you are taking these medications     Start Taking Instead of    atorvastatin (LIPITOR) 40 MG tablet atorvastatin (LIPITOR) 40 MG tablet    Dosage:  Take 2 tablets (80 mg total) by mouth once daily. Dosage:  Take 1 tablet (40 mg total) by mouth once daily.    Reason for Change:  Reorder            Verify that the below list of medications is an accurate representation of the medications you are currently taking.  If none reported, the list may be blank. If incorrect, please contact your healthcare provider. Carry this list with you in case of emergency.           Current Medications     atorvastatin (LIPITOR) 40 MG tablet Take 2 tablets (80 mg total) by mouth once daily.    carvedilol (COREG) 25 MG tablet Take 1 tablet (25 mg total) by mouth 2 (two) times daily with meals.    chlorthalidone (HYGROTEN) 25 MG Tab Take 1 tablet (25 mg total) by mouth once daily.    valsartan (DIOVAN) 160 MG tablet Take 1 tablet (160 mg total) by mouth once daily.    aspirin (ECOTRIN) 81 MG EC tablet Take 1 tablet (81 mg total) by mouth once daily.           Clinical Reference Information           Your Vitals Were     BP Pulse Height Weight BMI    147/87 (BP Location: Left arm, Patient Position: Sitting, BP Method: Automatic) 70 5' 5" (1.651 m) 74.7 kg (164 lb 10.9 oz) 27.4 kg/m2      Blood Pressure          Most Recent Value    Right Arm BP - Sitting  142/85    Left Arm BP - Sitting  147/87    BP  (!)  147/87      Allergies as of 3/16/2017     No Known Allergies      Immunizations Administered on Date of Encounter - 3/16/2017     None      Orders Placed During Today's Visit     Future Labs/Procedures Expected by Expires    Lipid panel  4/27/2017 (Approximate) 3/16/2018      [facility] Sign-Up     Activating your [facility] account is as easy as 1-2-3!     1) Visit [email or web address], select Sign Up Now, enter this " activation code and your date of birth, then select Next.  [email or web address]  Expires: 4/21/2017  2:04 PM      2) Create a username and password to use when you visit [facility] in the future and select a security question in case you lose your password and select Next.    3) Enter your e-mail address and click Sign Up!    Additional Information  If you have questions, please e-mail [email or web address] or call [phone] to talk to our [facility] staff. Remember, [facility] is NOT to be used for urgent needs. For medical emergencies, dial 911.         Language Assistance Services     ATTENTION: Language assistance services are available, free of charge. Please call [phone].      ATENCIÓN: Si habla español, tiene a sanchez disposición servicios gratuitos de asistencia lingüística. Llame al [phone].     CHÚ Ý: N?u b?n nói Ti?ng Vi?t, có các d?ch v? h? tr? ngôn ng? mi?n phí dành cho b?n. G?i s? [phone].         [facility] complies with applicable Federal civil rights laws and does not discriminate on the basis of race, color, national origin, age, disability, or sex.

## 2017-03-21 ENCOUNTER — TELEPHONE (OUTPATIENT)
Dept: CARDIOLOGY | Facility: CLINIC | Age: 62
End: 2017-03-21

## 2017-03-21 NOTE — TELEPHONE ENCOUNTER
----- Message from Nancy Sanchez sent at 3/21/2017  9:21 AM CDT -----  Contact: pt  Pt says he need a return to work slip from the doctor.    Thanks

## 2017-04-04 DIAGNOSIS — I51.89 COMBINED SYSTOLIC AND DIASTOLIC CARDIAC DYSFUNCTION: ICD-10-CM

## 2017-04-04 DIAGNOSIS — Z95.1 S/P CABG (CORONARY ARTERY BYPASS GRAFT): ICD-10-CM

## 2017-04-04 DIAGNOSIS — I10 ESSENTIAL HYPERTENSION: ICD-10-CM

## 2017-04-04 DIAGNOSIS — I10 SEVERE HYPERTENSION: ICD-10-CM

## 2017-04-04 DIAGNOSIS — I49.3 PVC'S (PREMATURE VENTRICULAR CONTRACTIONS): ICD-10-CM

## 2017-04-04 NOTE — TELEPHONE ENCOUNTER
----- Message from Arina Knxo sent at 4/4/2017  2:00 PM CDT -----  Contact: patient  Please call pt at [phone] if any questions. Refill needed for Carvedilol 25 mg x 30 day supply (take 2 tablets a day) called into [facility] at [phone] (this # given by patient).  Last seen Dr Olivas 03/16/17    Thank you

## 2017-04-05 RX ORDER — CARVEDILOL 25 MG/1
25 TABLET ORAL 2 TIMES DAILY WITH MEALS
Qty: 60 TABLET | Refills: 11 | Status: SHIPPED | OUTPATIENT
Start: 2017-04-05 | End: 2018-03-19 | Stop reason: SDUPTHER

## 2017-04-27 ENCOUNTER — TELEPHONE (OUTPATIENT)
Dept: CARDIOLOGY | Facility: CLINIC | Age: 62
End: 2017-04-27

## 2017-04-27 ENCOUNTER — LAB VISIT (OUTPATIENT)
Dept: LAB | Facility: HOSPITAL | Age: 62
End: 2017-04-27
Attending: INTERNAL MEDICINE
Payer: COMMERCIAL

## 2017-04-27 ENCOUNTER — OFFICE VISIT (OUTPATIENT)
Dept: CARDIOLOGY | Facility: CLINIC | Age: 62
End: 2017-04-27
Payer: COMMERCIAL

## 2017-04-27 VITALS
HEART RATE: 64 BPM | BODY MASS INDEX: 24.39 KG/M2 | WEIGHT: 164.69 LBS | DIASTOLIC BLOOD PRESSURE: 93 MMHG | HEIGHT: 69 IN | SYSTOLIC BLOOD PRESSURE: 186 MMHG

## 2017-04-27 DIAGNOSIS — I10 SEVERE HYPERTENSION: ICD-10-CM

## 2017-04-27 DIAGNOSIS — I51.89 COMBINED SYSTOLIC AND DIASTOLIC CARDIAC DYSFUNCTION: ICD-10-CM

## 2017-04-27 DIAGNOSIS — E78.5 HYPERLIPIDEMIA, UNSPECIFIED: ICD-10-CM

## 2017-04-27 DIAGNOSIS — I10 ESSENTIAL HYPERTENSION: ICD-10-CM

## 2017-04-27 DIAGNOSIS — I49.3 PVC'S (PREMATURE VENTRICULAR CONTRACTIONS): ICD-10-CM

## 2017-04-27 DIAGNOSIS — Z95.1 S/P CABG (CORONARY ARTERY BYPASS GRAFT): Primary | ICD-10-CM

## 2017-04-27 LAB
CHOLEST/HDLC SERPL: 3.7 {RATIO}
HDL/CHOLESTEROL RATIO: 26.9 %
HDLC SERPL-MCNC: 175 MG/DL
HDLC SERPL-MCNC: 47 MG/DL
LDLC SERPL CALC-MCNC: 90.2 MG/DL
NONHDLC SERPL-MCNC: 128 MG/DL
TRIGL SERPL-MCNC: 189 MG/DL

## 2017-04-27 PROCEDURE — 1160F RVW MEDS BY RX/DR IN RCRD: CPT | Mod: S$GLB,,, | Performed by: INTERNAL MEDICINE

## 2017-04-27 PROCEDURE — 99213 OFFICE O/P EST LOW 20 MIN: CPT | Mod: S$GLB,,, | Performed by: INTERNAL MEDICINE

## 2017-04-27 PROCEDURE — 99999 PR PBB SHADOW E&M-EST. PATIENT-LVL III: CPT | Mod: PBBFAC,,, | Performed by: INTERNAL MEDICINE

## 2017-04-27 PROCEDURE — 3080F DIAST BP >= 90 MM HG: CPT | Mod: S$GLB,,, | Performed by: INTERNAL MEDICINE

## 2017-04-27 PROCEDURE — 3077F SYST BP >= 140 MM HG: CPT | Mod: S$GLB,,, | Performed by: INTERNAL MEDICINE

## 2017-04-27 RX ORDER — EZETIMIBE 10 MG/1
10 TABLET ORAL DAILY
Qty: 90 TABLET | Refills: 3 | Status: SHIPPED | OUTPATIENT
Start: 2017-04-27 | End: 2018-03-25 | Stop reason: SDUPTHER

## 2017-04-27 RX ORDER — VALSARTAN 160 MG/1
320 TABLET ORAL DAILY
Qty: 30 TABLET | Refills: 11 | Status: SHIPPED | OUTPATIENT
Start: 2017-04-27 | End: 2017-04-27 | Stop reason: SDUPTHER

## 2017-04-27 RX ORDER — VALSARTAN 320 MG/1
320 TABLET ORAL DAILY
Qty: 30 TABLET | Refills: 11 | Status: SHIPPED | OUTPATIENT
Start: 2017-04-27 | End: 2018-05-15 | Stop reason: SDUPTHER

## 2017-04-27 NOTE — LETTER
April 27, 2017    Poppy Jeffries  [address] [facility] [place] [address]         [facility]  [address]  [facility] [address]  Phone: [phone] April 27, 2017     Patient: Poppy Jeffries   YOB: 1955   Date of Visit: 4/27/2017       To Whom It May Concern:    It is my medical opinion that Poppy Jeffries may return to full duty immediately with no restrictions.    If you have any questions or concerns, please don't hesitate to call.    Sincerely,        Jose Rafael Olivas MD

## 2017-04-27 NOTE — TELEPHONE ENCOUNTER
Spoke with Dr. Olivas - dose of valsartan clarified - is 320 mg tab one daily - spoke with Jennifer at the pharmacy and gave a verbal ok - new Rx sent to the pt's pharmacy.

## 2017-04-27 NOTE — PROGRESS NOTES
"Chart has been dictated using voice recognition software.  It is not been reviewed carefully for any transcriptional errors due to this technology.   Subjective:   Patient ID:  Poppy Jeffries is a 61 y.o. male who presents for follow-up of Hypertension (6 week f/u)      HPI: Patient with essential hypertension , asymptomatic PVCs, Combined systolic and diastolic cardiac dysfunction, hyperlipidemia, severe hypertension, and S/P CABG. Patient denies any chest discomfort on exertion or at rest.  Patient denies any dyspnea at rest or on exertion, orthopnea, PND, or edema.  Patient denies any palpitations, lightheadedness, or syncope.     No past medical history on file.    Outpatient Medications Prior to Visit   Medication Sig Dispense Refill    aspirin (ECOTRIN) 81 MG EC tablet Take 1 tablet (81 mg total) by mouth once daily. 90 tablet 3    atorvastatin (LIPITOR) 40 MG tablet Take 2 tablets (80 mg total) by mouth once daily. 30 tablet 11    carvedilol (COREG) 25 MG tablet Take 1 tablet (25 mg total) by mouth 2 (two) times daily with meals. 60 tablet 11    chlorthalidone (HYGROTEN) 25 MG Tab Take 1 tablet (25 mg total) by mouth once daily. 30 tablet 11    valsartan (DIOVAN) 160 MG tablet Take 1 tablet (160 mg total) by mouth once daily. 30 tablet 11     No facility-administered medications prior to visit.        ROS -No change from prior visit in neurologic, respiratory, endocrine, GI, hematologic, or constitutional complaints   Objective:   Physical Exam   Constitutional: He is oriented to person, place, and time. He appears well-developed and well-nourished.   BP (!) 186/93 (BP Location: Left arm, Patient Position: Sitting, BP Method: Automatic)  Pulse 64  Ht 5' 9" (1.753 m)  Wt 74.7 kg (164 lb 10.9 oz)  BMI 24.32 kg/m2  Repeat blood pressure showed systolic values between 140-160 depending on whether the first Korotkoff sound was following a PVC or following a sinus beat.   Neck: Neck supple. No JVD present. " Carotid bruit is not present.   Cardiovascular: Normal rate, regular rhythm and intact distal pulses.  Frequent extrasystoles are present. Exam reveals no gallop and no friction rub.    Murmur heard.   Medium-pitched scratchy systolic murmur is present with a grade of 2/6  at the lower left sternal border  Pulmonary/Chest: Effort normal and breath sounds normal. He has no wheezes. He has no rales.   Abdominal: Soft. Bowel sounds are normal. He exhibits no abdominal bruit. There is no hepatomegaly. There is no tenderness.   Musculoskeletal: He exhibits no edema.   Neurological: He is alert and oriented to person, place, and time. He has normal strength.   Skin: No cyanosis. Nails show no clubbing.         Lab Results   Component Value Date     03/07/2017    K 4.7 03/07/2017    BUN 15 03/07/2017    CREATININE 1.3 03/07/2017    GLU 94 03/07/2017    CHOL 175 04/27/2017    HDL 47 04/27/2017    LDLCALC 90.2 04/27/2017    TRIG 189 (H) 04/27/2017    CHOLHDL 26.9 04/27/2017       Assessment:     1. S/P CABG (coronary artery bypass graft)    2. Essential hypertension    3. Combined systolic and diastolic cardiac dysfunction    4. Severe hypertension    5. Hyperlipidemia, unspecified    6. PVC's (premature ventricular contractions)      The patient appears to be stable and continues to remain asymptomatic from cardiac ischemia, heart failure, or arrhythmias.  Therefore, patient will be cleared to be in working.  However, the patient should have further cardiac evaluation with a 24-hour Holter monitor and a stress SPECT test.  Patient's blood pressure remains elevated even when not following an ectopic beat.  Therefore, his valsartan will be increased.  His lipid profile shows marked improvement in his cholesterol, however, his LDL is still high.  Therefore, ezetimibe will be added to his medical regimen.  Patient should return in one month for reevaluation.  Plan:     Poppy was seen today for hypertension.    Diagnoses  and all orders for this visit:    S/P CABG (coronary artery bypass graft)  -     Discontinue: valsartan (DIOVAN) 160 MG tablet; Take 2 tablets (320 mg total) by mouth once daily.  -     valsartan (DIOVAN) 320 MG tablet; Take 1 tablet (320 mg total) by mouth once daily.    Essential hypertension  -     Discontinue: valsartan (DIOVAN) 160 MG tablet; Take 2 tablets (320 mg total) by mouth once daily.  -     valsartan (DIOVAN) 320 MG tablet; Take 1 tablet (320 mg total) by mouth once daily.    Combined systolic and diastolic cardiac dysfunction  -     Discontinue: valsartan (DIOVAN) 160 MG tablet; Take 2 tablets (320 mg total) by mouth once daily.  -     valsartan (DIOVAN) 320 MG tablet; Take 1 tablet (320 mg total) by mouth once daily.    Severe hypertension  -     Discontinue: valsartan (DIOVAN) 160 MG tablet; Take 2 tablets (320 mg total) by mouth once daily.  -     valsartan (DIOVAN) 320 MG tablet; Take 1 tablet (320 mg total) by mouth once daily.    Hyperlipidemia, unspecified    PVC's (premature ventricular contractions)

## 2017-04-27 NOTE — TELEPHONE ENCOUNTER
----- Message from Dary Godwin sent at 4/27/2017 10:37 AM CDT -----  Contact: [facility] called, requesting to change the directions and quantity of RX Valsartan. Ph for pharmacy is [phone]. Pt of Dr. Olivas and LOV 4/27/17. Thank you

## 2017-04-27 NOTE — MR AVS SNAPSHOT
[facility]  [address]  [facility] [address]  Phone: [phone]                  Poppy Jeffries   2017 10:30 AM   Office Visit    Description:  Male : 1955   Provider:  Jose Rafael Olivas MD   Department:  [facility]           Reason for Visit     Hypertension           Diagnoses this Visit        Comments    S/P CABG (coronary artery bypass graft)         Essential hypertension         Combined systolic and diastolic cardiac dysfunction         Severe hypertension                To Do List           Future Appointments        Provider Department Dept Phone    2017 10:30 AM MD [facility] [phone]      Goals (5 Years of Data)     None      Follow-Up and Disposition     Return in about 4 weeks (around 2017).       These Medications        Disp Refills Start End    valsartan (DIOVAN) 160 MG tablet 30 tablet 11 2017     Take 2 tablets (320 mg total) by mouth once daily. - Oral    Pharmacy: [facility]/pharmacy #[id number] - [place] - [address] Ph #: [phone]         [facility] On Call     [facility] On Call Nurse Care Line -  Assistance  Unless otherwise directed by your provider, please contact [facility], our nurse care line that is available for  assistance.     Registered nurses in the [facility] On Call Center provide: appointment scheduling, clinical advisement, health education, and other advisory services.  Call: [phone] (toll free)               Medications           Message regarding Medications     Verify the changes and/or additions to your medication regime listed below are the same as discussed with your clinician today.  If any of these changes or additions are incorrect, please notify your healthcare provider.        CHANGE how you are taking these medications     Start Taking Instead of    valsartan (DIOVAN) 160 MG tablet valsartan (DIOVAN) 160 MG tablet    Dosage:  Take 2 tablets (320 mg  "total) by mouth once daily. Dosage:  Take 1 tablet (160 mg total) by mouth once daily.    Reason for Change:  Reorder            Verify that the below list of medications is an accurate representation of the medications you are currently taking.  If none reported, the list may be blank. If incorrect, please contact your healthcare provider. Carry this list with you in case of emergency.           Current Medications     aspirin (ECOTRIN) 81 MG EC tablet Take 1 tablet (81 mg total) by mouth once daily.    atorvastatin (LIPITOR) 40 MG tablet Take 2 tablets (80 mg total) by mouth once daily.    carvedilol (COREG) 25 MG tablet Take 1 tablet (25 mg total) by mouth 2 (two) times daily with meals.    chlorthalidone (HYGROTEN) 25 MG Tab Take 1 tablet (25 mg total) by mouth once daily.    valsartan (DIOVAN) 160 MG tablet Take 2 tablets (320 mg total) by mouth once daily.           Clinical Reference Information           Your Vitals Were     BP Pulse Height Weight BMI    186/93 (BP Location: Left arm, Patient Position: Sitting, BP Method: Automatic) 64 5' 9" (1.753 m) 74.7 kg (164 lb 10.9 oz) 24.32 kg/m2      Blood Pressure          Most Recent Value    Right Arm BP - Sitting  180/86    Left Arm BP - Sitting  186/93    BP  (!)  186/93      Allergies as of 4/27/2017     No Known Allergies      Immunizations Administered on Date of Encounter - 4/27/2017     None      [facility] Sign-Up     Activating your [facility] account is as easy as 1-2-3!     1) Visit [email or web address], select Sign Up Now, enter this activation code and your date of birth, then select Next.  [email or web address]  Expires: 6/11/2017  8:42 AM      2) Create a username and password to use when you visit [facility] in the future and select a security question in case you lose your password and select Next.    3) Enter your e-mail address and click Sign Up!    Additional Information  If you have questions, please e-mail [email or web address] or call [phone] to " talk to our [facility] staff. Remember, [facility] is NOT to be used for urgent needs. For medical emergencies, dial 911.         Language Assistance Services     ATTENTION: Language assistance services are available, free of charge. Please call [phone].      ATENCIÓN: Si habla carlos, tiene a sanchez disposición servicios gratuitos de asistencia lingüística. Llame al [phone].     CHÚ Ý: N?u b?n nói Ti?ng Vi?t, có các d?ch v? h? tr? ngôn ng? mi?n phí dành cho b?n. G?i s? [phone].         [facility] complies with applicable Federal civil rights laws and does not discriminate on the basis of race, color, national origin, age, disability, or sex.

## 2017-04-29 DIAGNOSIS — Z95.1 S/P CABG (CORONARY ARTERY BYPASS GRAFT): ICD-10-CM

## 2017-04-29 DIAGNOSIS — I10 SEVERE HYPERTENSION: ICD-10-CM

## 2017-05-01 RX ORDER — CHLORTHALIDONE 25 MG/1
TABLET ORAL
Qty: 30 TABLET | Refills: 10 | Status: SHIPPED | OUTPATIENT
Start: 2017-05-01 | End: 2018-03-19 | Stop reason: SDUPTHER

## 2017-05-18 ENCOUNTER — CLINICAL SUPPORT (OUTPATIENT)
Dept: CARDIOLOGY | Facility: CLINIC | Age: 62
End: 2017-05-18
Payer: COMMERCIAL

## 2017-05-18 DIAGNOSIS — Z95.1 S/P CABG (CORONARY ARTERY BYPASS GRAFT): ICD-10-CM

## 2017-05-18 DIAGNOSIS — I51.89 COMBINED SYSTOLIC AND DIASTOLIC CARDIAC DYSFUNCTION: ICD-10-CM

## 2017-05-18 PROCEDURE — A9502 TC99M TETROFOSMIN: HCPCS | Mod: S$GLB,,, | Performed by: INTERNAL MEDICINE

## 2017-05-18 PROCEDURE — 93015 CV STRESS TEST SUPVJ I&R: CPT | Mod: S$GLB,,, | Performed by: INTERNAL MEDICINE

## 2017-05-18 PROCEDURE — 78452 HT MUSCLE IMAGE SPECT MULT: CPT | Mod: S$GLB,,, | Performed by: INTERNAL MEDICINE

## 2017-05-25 ENCOUNTER — OFFICE VISIT (OUTPATIENT)
Dept: CARDIOLOGY | Facility: CLINIC | Age: 62
End: 2017-05-25
Payer: COMMERCIAL

## 2017-05-25 VITALS
WEIGHT: 166 LBS | HEIGHT: 69 IN | HEART RATE: 65 BPM | SYSTOLIC BLOOD PRESSURE: 125 MMHG | DIASTOLIC BLOOD PRESSURE: 69 MMHG | BODY MASS INDEX: 24.59 KG/M2

## 2017-05-25 DIAGNOSIS — I51.89 COMBINED SYSTOLIC AND DIASTOLIC CARDIAC DYSFUNCTION: ICD-10-CM

## 2017-05-25 DIAGNOSIS — E78.5 HYPERLIPIDEMIA, UNSPECIFIED: ICD-10-CM

## 2017-05-25 DIAGNOSIS — I49.3 PVC'S (PREMATURE VENTRICULAR CONTRACTIONS): ICD-10-CM

## 2017-05-25 DIAGNOSIS — Z95.1 S/P CABG (CORONARY ARTERY BYPASS GRAFT): Primary | ICD-10-CM

## 2017-05-25 DIAGNOSIS — I10 ESSENTIAL HYPERTENSION: ICD-10-CM

## 2017-05-25 PROCEDURE — 99212 OFFICE O/P EST SF 10 MIN: CPT | Mod: S$GLB,,, | Performed by: INTERNAL MEDICINE

## 2017-05-25 PROCEDURE — 99999 PR PBB SHADOW E&M-EST. PATIENT-LVL III: CPT | Mod: PBBFAC,,, | Performed by: INTERNAL MEDICINE

## 2017-05-25 NOTE — PROGRESS NOTES
Chart has been dictated using voice recognition software.  It is not been reviewed carefully for any transcriptional errors due to this technology.   Subjective:   Patient ID:  Poppy Jeffries is a 61 y.o. male who presents for follow-up of No chief complaint on file.      HPI: Patient with essential hypertension , asymptomatic PVCs, Combined systolic and diastolic cardiac dysfunction, hyperlipidemia, severe hypertension, and S/P CABG.    Stress SPECT (18-May-2017):  The patient exercised for 6.4 minutes on a High Ramp protocol, corresponding to a functional capacity of 11 estimated METS, achieving a peak heart rate of 107 bpm, which is 67% of the age predicted maximum heart rate. .   EKG Conclusions:  1. The EKG portion of this study is abnormal but non diagnostic for ischemia at a peak heart rate of 107 bpm (67% of predicted).   2. Sensitivity is impaired due to beta blocker therapy. Specificity is reduced secondary to resting ST segment changes.   3. Blood pressure remained stable throughout the protocol  (Presenting BP: 117/84 Peak BP: 170/95).   4. The following arrhythmias were present: very frequent PVCs.   5. There were no symptoms of chest discomfort or significant dyspnea throughout the protocol.   Nuclear Quantitative Functional Analysis:   LVEF: 46 % (normal is >= 51%)  LVED Volume: 143 ml (normal is <=171)  LVES Volume: 77 ml (normal is <=70)  Impression: ABNORMAL MYOCARDIAL PERFUSION  1. The perfusion scan is free of evidence for myocardial ischemia while on beta blocker therapy.   2. There is a small size fixed defect of severe intensity that extends from the base to the mid inferior wall of the left ventricle, consistent with myocardial injury.   3. There is abnormal wall motion at rest showing akinesis of the inferobasilar wall of the left ventricle.     Prior echo (07-Mar-2017) showed EF 40-45% with inferior hypokinesis compatible with SPECT findings.  4. There is resting LV dysfunction with a reduced  "ejection fraction of 46 %.  (normal is >= 51%)  5. The ventricular volumes are normal at rest and stress.   6. The extracardiac distribution of radioactivity is normal.      The patient remains asymptomatic, and is tolerating his medications without side effects.  Unfortunately, the patient's job offer has been withdrawn he does not have a job at this time.  He does maintain health insurance through his wife.    No past medical history on file.    Outpatient Medications Prior to Visit   Medication Sig Dispense Refill    aspirin (ECOTRIN) 81 MG EC tablet Take 1 tablet (81 mg total) by mouth once daily. 90 tablet 3    atorvastatin (LIPITOR) 40 MG tablet Take 2 tablets (80 mg total) by mouth once daily. 30 tablet 11    carvedilol (COREG) 25 MG tablet Take 1 tablet (25 mg total) by mouth 2 (two) times daily with meals. 60 tablet 11    chlorthalidone (HYGROTEN) 25 MG Tab TAKE 1 TABLET BY MOUTH EVERY DAY 30 tablet 10    ezetimibe (ZETIA) 10 mg tablet Take 1 tablet (10 mg total) by mouth once daily. 90 tablet 3    valsartan (DIOVAN) 320 MG tablet Take 1 tablet (320 mg total) by mouth once daily. 30 tablet 11     No facility-administered medications prior to visit.        ROS - No change from prior visit in neurologic, respiratory, endocrine, GI, hematologic, or constitutional complaints   Objective:   Physical Exam   Constitutional: He is oriented to person, place, and time. He appears well-developed and well-nourished.   /69   Pulse 65   Ht 5' 9" (1.753 m)   Wt 75.3 kg (166 lb 0.1 oz)   BMI 24.51 kg/m²      Neck: Neck supple. No JVD present. Carotid bruit is not present.   Cardiovascular: Normal rate, regular rhythm, normal heart sounds and intact distal pulses.  Exam reveals no gallop and no friction rub.    No murmur heard.  Pulmonary/Chest: Effort normal and breath sounds normal. He has no wheezes. He has no rales.   Abdominal: Soft. Bowel sounds are normal. He exhibits no abdominal bruit. There is no " hepatomegaly. There is no tenderness.   Musculoskeletal: He exhibits no edema.   Neurological: He is alert and oriented to person, place, and time. He has normal strength.   Skin: No cyanosis. Nails show no clubbing.         Lab Results   Component Value Date     03/07/2017    K 4.7 03/07/2017    BUN 15 03/07/2017    CREATININE 1.3 03/07/2017    GLU 94 03/07/2017    CHOL 175 04/27/2017    HDL 47 04/27/2017    LDLCALC 90.2 04/27/2017    TRIG 189 (H) 04/27/2017    CHOLHDL 26.9 04/27/2017       Assessment:     1. S/P CABG (coronary artery bypass graft)    2. PVC's (premature ventricular contractions)    3. Hyperlipidemia, unspecified    4. Essential hypertension    5. Combined systolic and diastolic cardiac dysfunction      The patient is doing very well at this point. Patient has no symptoms of cardiac ischemia, heart failure, or significant arrhythmias.  His stress test showed no evidence of ongoing ischemia.  His blood pressure is well-controlled at this point and his cholesterol is close to goal.  The patient will undergo repeat lipid profile to evaluate the effects of ezetimibe. The patient needs to stay on his current medical regimen and was maintained reduced risk of further cardiovascular events.    Patient should return in 6 months as long as he is not having symptoms of ischemia, heart failure, or symptomatic arrhythmias. patient will also be referred to internal medicine in order to obtain a PCP.  Plan:     Diagnoses and all orders for this visit:    S/P CABG (coronary artery bypass graft)    PVC's (premature ventricular contractions)    Hyperlipidemia, unspecified    Essential hypertension    Combined systolic and diastolic cardiac dysfunction

## 2017-05-31 ENCOUNTER — LAB VISIT (OUTPATIENT)
Dept: LAB | Facility: HOSPITAL | Age: 62
End: 2017-05-31
Attending: INTERNAL MEDICINE
Payer: COMMERCIAL

## 2017-05-31 DIAGNOSIS — E78.5 HYPERLIPIDEMIA, UNSPECIFIED: ICD-10-CM

## 2017-05-31 LAB
CHOLEST/HDLC SERPL: 3.7 {RATIO}
HDL/CHOLESTEROL RATIO: 26.9 %
HDLC SERPL-MCNC: 160 MG/DL
HDLC SERPL-MCNC: 43 MG/DL
LDLC SERPL CALC-MCNC: 86.4 MG/DL
NONHDLC SERPL-MCNC: 117 MG/DL
TRIGL SERPL-MCNC: 153 MG/DL

## 2017-05-31 PROCEDURE — 80061 LIPID PANEL: CPT

## 2017-05-31 PROCEDURE — 36415 COLL VENOUS BLD VENIPUNCTURE: CPT

## 2017-06-02 ENCOUNTER — PATIENT MESSAGE (OUTPATIENT)
Dept: CARDIOLOGY | Facility: CLINIC | Age: 62
End: 2017-06-02

## 2017-07-07 ENCOUNTER — PATIENT MESSAGE (OUTPATIENT)
Dept: RESEARCH | Facility: HOSPITAL | Age: 62
End: 2017-07-07

## 2018-03-13 RX ORDER — ATORVASTATIN CALCIUM 40 MG/1
80 TABLET, FILM COATED ORAL DAILY
Qty: 30 TABLET | Refills: 10 | Status: SHIPPED | OUTPATIENT
Start: 2018-03-13 | End: 2019-01-24 | Stop reason: SDUPTHER

## 2018-03-19 DIAGNOSIS — Z95.1 S/P CABG (CORONARY ARTERY BYPASS GRAFT): ICD-10-CM

## 2018-03-19 DIAGNOSIS — I10 ESSENTIAL HYPERTENSION: ICD-10-CM

## 2018-03-19 DIAGNOSIS — I51.89 COMBINED SYSTOLIC AND DIASTOLIC CARDIAC DYSFUNCTION: ICD-10-CM

## 2018-03-19 DIAGNOSIS — I10 SEVERE HYPERTENSION: ICD-10-CM

## 2018-03-19 DIAGNOSIS — I49.3 PVC'S (PREMATURE VENTRICULAR CONTRACTIONS): ICD-10-CM

## 2018-03-19 RX ORDER — CHLORTHALIDONE 25 MG/1
TABLET ORAL
Qty: 30 TABLET | Refills: 10 | Status: SHIPPED | OUTPATIENT
Start: 2018-03-19 | End: 2018-03-29 | Stop reason: SDUPTHER

## 2018-03-19 RX ORDER — CARVEDILOL 25 MG/1
TABLET ORAL
Qty: 60 TABLET | Refills: 10 | Status: SHIPPED | OUTPATIENT
Start: 2018-03-19 | End: 2018-03-25 | Stop reason: SDUPTHER

## 2018-03-25 DIAGNOSIS — Z95.1 S/P CABG (CORONARY ARTERY BYPASS GRAFT): ICD-10-CM

## 2018-03-25 DIAGNOSIS — I10 SEVERE HYPERTENSION: ICD-10-CM

## 2018-03-25 DIAGNOSIS — I10 ESSENTIAL HYPERTENSION: ICD-10-CM

## 2018-03-25 DIAGNOSIS — E78.5 HYPERLIPIDEMIA, UNSPECIFIED: ICD-10-CM

## 2018-03-25 DIAGNOSIS — I49.3 PVC'S (PREMATURE VENTRICULAR CONTRACTIONS): ICD-10-CM

## 2018-03-25 DIAGNOSIS — I51.89 COMBINED SYSTOLIC AND DIASTOLIC CARDIAC DYSFUNCTION: ICD-10-CM

## 2018-03-25 RX ORDER — CARVEDILOL 25 MG/1
TABLET ORAL
Qty: 60 TABLET | Refills: 10 | Status: SHIPPED | OUTPATIENT
Start: 2018-03-25

## 2018-03-25 RX ORDER — EZETIMIBE 10 MG/1
10 TABLET ORAL DAILY
Qty: 90 TABLET | Refills: 3 | Status: SHIPPED | OUTPATIENT
Start: 2018-03-25 | End: 2019-03-25

## 2018-03-29 DIAGNOSIS — I10 SEVERE HYPERTENSION: ICD-10-CM

## 2018-03-29 DIAGNOSIS — Z95.1 S/P CABG (CORONARY ARTERY BYPASS GRAFT): ICD-10-CM

## 2018-03-29 RX ORDER — CHLORTHALIDONE 25 MG/1
TABLET ORAL
Qty: 30 TABLET | Refills: 10 | Status: SHIPPED | OUTPATIENT
Start: 2018-03-29

## 2018-04-16 RX ORDER — ATORVASTATIN CALCIUM 40 MG/1
40 TABLET, FILM COATED ORAL DAILY
Qty: 30 TABLET | Refills: 0 | Status: SHIPPED | OUTPATIENT
Start: 2018-04-16

## 2018-05-15 DIAGNOSIS — I10 ESSENTIAL HYPERTENSION: ICD-10-CM

## 2018-05-15 DIAGNOSIS — I51.89 COMBINED SYSTOLIC AND DIASTOLIC CARDIAC DYSFUNCTION: ICD-10-CM

## 2018-05-15 DIAGNOSIS — Z95.1 S/P CABG (CORONARY ARTERY BYPASS GRAFT): ICD-10-CM

## 2018-05-15 DIAGNOSIS — I10 SEVERE HYPERTENSION: ICD-10-CM

## 2018-05-15 RX ORDER — VALSARTAN 320 MG/1
320 TABLET ORAL DAILY
Qty: 30 TABLET | Refills: 11 | Status: SHIPPED | OUTPATIENT
Start: 2018-05-15

## 2019-01-24 RX ORDER — ATORVASTATIN CALCIUM 40 MG/1
80 TABLET, FILM COATED ORAL DAILY
Qty: 30 TABLET | Refills: 10 | Status: SHIPPED | OUTPATIENT
Start: 2019-01-24

## 2019-01-28 DIAGNOSIS — I10 SEVERE HYPERTENSION: ICD-10-CM

## 2019-01-28 DIAGNOSIS — Z95.1 S/P CABG (CORONARY ARTERY BYPASS GRAFT): ICD-10-CM

## 2019-01-28 RX ORDER — CHLORTHALIDONE 25 MG/1
TABLET ORAL
Qty: 30 TABLET | Refills: 10 | OUTPATIENT
Start: 2019-01-28

## 2019-01-31 DIAGNOSIS — I51.89 COMBINED SYSTOLIC AND DIASTOLIC CARDIAC DYSFUNCTION: ICD-10-CM

## 2019-01-31 DIAGNOSIS — I49.3 PVC'S (PREMATURE VENTRICULAR CONTRACTIONS): ICD-10-CM

## 2019-01-31 DIAGNOSIS — I10 SEVERE HYPERTENSION: ICD-10-CM

## 2019-01-31 DIAGNOSIS — Z95.1 S/P CABG (CORONARY ARTERY BYPASS GRAFT): ICD-10-CM

## 2019-01-31 DIAGNOSIS — I10 ESSENTIAL HYPERTENSION: ICD-10-CM

## 2019-01-31 RX ORDER — CARVEDILOL 25 MG/1
TABLET ORAL
Qty: 60 TABLET | Refills: 10 | OUTPATIENT
Start: 2019-01-31